# Patient Record
Sex: FEMALE | Race: BLACK OR AFRICAN AMERICAN | Employment: FULL TIME | URBAN - METROPOLITAN AREA
[De-identification: names, ages, dates, MRNs, and addresses within clinical notes are randomized per-mention and may not be internally consistent; named-entity substitution may affect disease eponyms.]

---

## 2020-02-06 ENCOUNTER — HOSPITAL ENCOUNTER (EMERGENCY)
Age: 35
Discharge: HOME | End: 2020-02-07
Attending: EMERGENCY MEDICINE
Payer: COMMERCIAL

## 2020-02-06 DIAGNOSIS — N30.00 ACUTE CYSTITIS WITHOUT HEMATURIA: ICD-10-CM

## 2020-02-06 DIAGNOSIS — R79.89 LOW TSH LEVEL: ICD-10-CM

## 2020-02-06 DIAGNOSIS — R45.851 SUICIDAL IDEATION: ICD-10-CM

## 2020-02-06 DIAGNOSIS — E86.0 DEHYDRATION: ICD-10-CM

## 2020-02-06 DIAGNOSIS — F32.A DEPRESSION, UNSPECIFIED DEPRESSION TYPE: Primary | ICD-10-CM

## 2020-02-06 DIAGNOSIS — E87.6 HYPOKALEMIA: ICD-10-CM

## 2020-02-06 LAB
ALBUMIN SERPL-MCNC: 4 G/DL (ref 3.5–5)
ALBUMIN/GLOB SERPL: 1.2 {RATIO} (ref 1.1–2.2)
ALP SERPL-CCNC: 112 U/L (ref 45–117)
ALT SERPL-CCNC: 27 U/L (ref 12–78)
ANION GAP SERPL CALC-SCNC: 7 MMOL/L (ref 5–15)
APAP SERPL-MCNC: 18 UG/ML (ref 10–30)
APPEARANCE UR: CLEAR
AST SERPL-CCNC: 17 U/L (ref 15–37)
BACTERIA URNS QL MICRO: ABNORMAL /HPF
BASOPHILS # BLD: 0.1 K/UL (ref 0–0.1)
BASOPHILS NFR BLD: 0 % (ref 0–1)
BILIRUB SERPL-MCNC: 2.9 MG/DL (ref 0.2–1)
BILIRUB UR QL CFM: NEGATIVE
BUN SERPL-MCNC: 11 MG/DL (ref 6–20)
BUN/CREAT SERPL: 16 (ref 12–20)
CALCIUM SERPL-MCNC: 8.9 MG/DL (ref 8.5–10.1)
CAOX CRY URNS QL MICRO: ABNORMAL
CHLORIDE SERPL-SCNC: 107 MMOL/L (ref 97–108)
CO2 SERPL-SCNC: 24 MMOL/L (ref 21–32)
COLOR UR: ABNORMAL
COMMENT, HOLDF: NORMAL
CREAT SERPL-MCNC: 0.67 MG/DL (ref 0.55–1.02)
DIFFERENTIAL METHOD BLD: ABNORMAL
EOSINOPHIL # BLD: 0.2 K/UL (ref 0–0.4)
EOSINOPHIL NFR BLD: 1 % (ref 0–7)
EPITH CASTS URNS QL MICRO: ABNORMAL /LPF
ERYTHROCYTE [DISTWIDTH] IN BLOOD BY AUTOMATED COUNT: 16.1 % (ref 11.5–14.5)
ETHANOL SERPL-MCNC: <10 MG/DL
GLOBULIN SER CALC-MCNC: 3.3 G/DL (ref 2–4)
GLUCOSE SERPL-MCNC: 104 MG/DL (ref 65–100)
GLUCOSE UR STRIP.AUTO-MCNC: NEGATIVE MG/DL
HCG SERPL-ACNC: <1 MIU/ML (ref 0–6)
HCT VFR BLD AUTO: 37.7 % (ref 35–47)
HGB BLD-MCNC: 14 G/DL (ref 11.5–16)
HGB UR QL STRIP: ABNORMAL
IMM GRANULOCYTES # BLD AUTO: 0.1 K/UL (ref 0–0.04)
IMM GRANULOCYTES NFR BLD AUTO: 1 % (ref 0–0.5)
KETONES UR QL STRIP.AUTO: 15 MG/DL
LEUKOCYTE ESTERASE UR QL STRIP.AUTO: ABNORMAL
LYMPHOCYTES # BLD: 2.9 K/UL (ref 0.8–3.5)
LYMPHOCYTES NFR BLD: 15 % (ref 12–49)
MCH RBC QN AUTO: 32.6 PG (ref 26–34)
MCHC RBC AUTO-ENTMCNC: 37.1 G/DL (ref 30–36.5)
MCV RBC AUTO: 87.9 FL (ref 80–99)
MONOCYTES # BLD: 1.1 K/UL (ref 0–1)
MONOCYTES NFR BLD: 6 % (ref 5–13)
NEUTS SEG # BLD: 14.7 K/UL (ref 1.8–8)
NEUTS SEG NFR BLD: 77 % (ref 32–75)
NITRITE UR QL STRIP.AUTO: NEGATIVE
NRBC # BLD: 0.02 K/UL (ref 0–0.01)
NRBC BLD-RTO: 0.1 PER 100 WBC
PH UR STRIP: 6 [PH] (ref 5–8)
PLATELET # BLD AUTO: 347 K/UL (ref 150–400)
PMV BLD AUTO: 8.7 FL (ref 8.9–12.9)
POTASSIUM SERPL-SCNC: 2.7 MMOL/L (ref 3.5–5.1)
PROT SERPL-MCNC: 7.3 G/DL (ref 6.4–8.2)
PROT UR STRIP-MCNC: 30 MG/DL
RBC # BLD AUTO: 4.29 M/UL (ref 3.8–5.2)
RBC #/AREA URNS HPF: ABNORMAL /HPF
SALICYLATES SERPL-MCNC: <1.7 MG/DL (ref 2.8–20)
SAMPLES BEING HELD,HOLD: NORMAL
SODIUM SERPL-SCNC: 138 MMOL/L (ref 136–145)
SP GR UR REFRACTOMETRY: 1.03 (ref 1–1.03)
TSH SERPL DL<=0.05 MIU/L-ACNC: 0.34 UIU/ML (ref 0.36–3.74)
UA: UC IF INDICATED,UAUC: ABNORMAL
UROBILINOGEN UR QL STRIP.AUTO: 1 EU/DL (ref 0.2–1)
WBC # BLD AUTO: 19 K/UL (ref 3.6–11)
WBC URNS QL MICRO: ABNORMAL /HPF

## 2020-02-06 PROCEDURE — 80053 COMPREHEN METABOLIC PANEL: CPT

## 2020-02-06 PROCEDURE — 81001 URINALYSIS AUTO W/SCOPE: CPT

## 2020-02-06 PROCEDURE — 84443 ASSAY THYROID STIM HORMONE: CPT

## 2020-02-06 PROCEDURE — 99284 EMERGENCY DEPT VISIT MOD MDM: CPT

## 2020-02-06 PROCEDURE — 36415 COLL VENOUS BLD VENIPUNCTURE: CPT

## 2020-02-06 PROCEDURE — 80307 DRUG TEST PRSMV CHEM ANLYZR: CPT

## 2020-02-06 PROCEDURE — 84702 CHORIONIC GONADOTROPIN TEST: CPT

## 2020-02-06 PROCEDURE — 87086 URINE CULTURE/COLONY COUNT: CPT

## 2020-02-06 PROCEDURE — 90791 PSYCH DIAGNOSTIC EVALUATION: CPT

## 2020-02-06 PROCEDURE — 85025 COMPLETE CBC W/AUTO DIFF WBC: CPT

## 2020-02-07 VITALS
WEIGHT: 209.88 LBS | HEART RATE: 92 BPM | RESPIRATION RATE: 16 BRPM | TEMPERATURE: 98.2 F | HEIGHT: 60 IN | DIASTOLIC BLOOD PRESSURE: 82 MMHG | BODY MASS INDEX: 41.2 KG/M2 | OXYGEN SATURATION: 99 % | SYSTOLIC BLOOD PRESSURE: 140 MMHG

## 2020-02-07 PROCEDURE — 74011250637 HC RX REV CODE- 250/637: Performed by: EMERGENCY MEDICINE

## 2020-02-07 RX ORDER — CEPHALEXIN 250 MG/1
500 CAPSULE ORAL
Status: COMPLETED | OUTPATIENT
Start: 2020-02-07 | End: 2020-02-07

## 2020-02-07 RX ORDER — CEPHALEXIN 500 MG/1
500 CAPSULE ORAL 4 TIMES DAILY
Qty: 28 CAP | Refills: 0 | Status: SHIPPED | OUTPATIENT
Start: 2020-02-07 | End: 2020-02-14

## 2020-02-07 RX ORDER — POTASSIUM CHLORIDE 750 MG/1
40 TABLET, FILM COATED, EXTENDED RELEASE ORAL
Status: COMPLETED | OUTPATIENT
Start: 2020-02-07 | End: 2020-02-07

## 2020-02-07 RX ORDER — POTASSIUM CHLORIDE 750 MG/1
20 TABLET, FILM COATED, EXTENDED RELEASE ORAL DAILY
Qty: 14 TAB | Refills: 0 | Status: SHIPPED | OUTPATIENT
Start: 2020-02-07 | End: 2020-02-14

## 2020-02-07 RX ADMIN — POTASSIUM CHLORIDE 40 MEQ: 750 TABLET, FILM COATED, EXTENDED RELEASE ORAL at 01:32

## 2020-02-07 RX ADMIN — CEPHALEXIN 500 MG: 250 CAPSULE ORAL at 01:33

## 2020-02-07 NOTE — DISCHARGE INSTRUCTIONS
Patient Education        Urinary Tract Infection in Women: Care Instructions  Your Care Instructions    A urinary tract infection, or UTI, is a general term for an infection anywhere between the kidneys and the urethra (where urine comes out). Most UTIs are bladder infections. They often cause pain or burning when you urinate. UTIs are caused by bacteria and can be cured with antibiotics. Be sure to complete your treatment so that the infection goes away. Follow-up care is a key part of your treatment and safety. Be sure to make and go to all appointments, and call your doctor if you are having problems. It's also a good idea to know your test results and keep a list of the medicines you take. How can you care for yourself at home? · Take your antibiotics as directed. Do not stop taking them just because you feel better. You need to take the full course of antibiotics. · Drink extra water and other fluids for the next day or two. This may help wash out the bacteria that are causing the infection. (If you have kidney, heart, or liver disease and have to limit fluids, talk with your doctor before you increase your fluid intake.)  · Avoid drinks that are carbonated or have caffeine. They can irritate the bladder. · Urinate often. Try to empty your bladder each time. · To relieve pain, take a hot bath or lay a heating pad set on low over your lower belly or genital area. Never go to sleep with a heating pad in place. To prevent UTIs  · Drink plenty of water each day. This helps you urinate often, which clears bacteria from your system. (If you have kidney, heart, or liver disease and have to limit fluids, talk with your doctor before you increase your fluid intake.)  · Urinate when you need to. · Urinate right after you have sex. · Change sanitary pads often. · Avoid douches, bubble baths, feminine hygiene sprays, and other feminine hygiene products that have deodorants.   · After going to the bathroom, wipe from front to back. When should you call for help? Call your doctor now or seek immediate medical care if:    · Symptoms such as fever, chills, nausea, or vomiting get worse or appear for the first time.     · You have new pain in your back just below your rib cage. This is called flank pain.     · There is new blood or pus in your urine.     · You have any problems with your antibiotic medicine.    Watch closely for changes in your health, and be sure to contact your doctor if:    · You are not getting better after taking an antibiotic for 2 days.     · Your symptoms go away but then come back. Where can you learn more? Go to http://rodger-jac.info/. Enter L048 in the search box to learn more about \"Urinary Tract Infection in Women: Care Instructions. \"  Current as of: December 19, 2018  Content Version: 12.2  © 8304-6080 [employer]. Care instructions adapted under license by [employer] (which disclaims liability or warranty for this information). If you have questions about a medical condition or this instruction, always ask your healthcare professional. [place] [address] any warranty or liability for your use of this information. Patient Education        Suicidal Thoughts and Behavior: Care Instructions  Your Care Instructions  You have been seen by a doctor because you've had thoughts about killing yourself. Maybe you have tried to do it. This is much different from having fleeting thoughts of death, which many people have from time to time. Your doctor and support team will work hard to help keep you safe. Your team may include a , a , and a counselor. Most people who think about suicide don't want to die. They think suicide will end their problems and pain. People who consider suicide often feel hopeless, helpless, and worthless. These thoughts can make a person feel that there is no other choice.   But you do have a choice. Help is always available. The doctor and staff members are taking you and your pain very seriously. It is important to remember that there are people who are willing and able to talk with you about your suicidal thoughts. Treatment and close follow-up care can help you feel better about life. Thoughts of hopelessness and suicide may come from being depressed. Depression is a medical condition. When you have depression, there may be problems with activity levels in certain parts of your brain. Chemicals in your brain called neurotransmitters may be out of balance. But depression can be treated. Treatment for depression includes counseling, medicines, and lifestyle changes. With treatment, you can feel better. Your doctor doesn't want you to hurt yourself. He or she may ask you to sign a \"no harm\" agreement or contract. This contract is your promise that you will not hurt yourself between now and your next visit. Be completely honest with your doctor if you feel that you can't sign it. You will get help. Follow-up care is a key part of your treatment and safety. Be sure to make and go to all appointments, and call your doctor if you are having problems. It's also a good idea to know your test results and keep a list of the medicines you take. How can you care for yourself at home? · Talk to someone. Reach out to family members, friends, your doctor, or a counselor. Be open and honest with them about your thoughts and feelings. · Be safe with medicines. Take your medicines exactly as prescribed. Call your doctor if you think you are having a problem with your medicine. · Avoid illegal drugs and alcohol. · Attend all counseling sessions recommended by your doctor. · Have someone take away sharp or dangerous objects, guns, and drugs from your home. · Keep the numbers for these national suicide hotlines: 1-751-733-TALK (3-216.686.1941) and 7-521-ELXCPTU (5-232.753.2427).   When should you call for help?  Call 911 anytime you think you may need emergency care. For example, call if:    · You feel you cannot stop from hurting yourself or someone else.   [facility] your doctor now or seek immediate medical care if:    · You have one or more warning signs of suicide. For example, call if:  ? You feel like giving away your possessions. ? You use illegal drugs or drink alcohol heavily. ? You talk or write about death. This may include writing suicide notes and talking about guns, knives, or pills. ? You start to spend a lot of time alone or spend more time alone than usual.     · You hear voices.     · You start acting in an aggressive way that's not normal for you.    Watch closely for changes in your health, and be sure to contact your doctor if you have any problems. Where can you learn more? Go to http://rodgerGraceful Tablesjac.info/. Enter U628 in the search box to learn more about \"Suicidal Thoughts and Behavior: Care Instructions. \"  Current as of: May 28, 2019  Content Version: 12.2  © 6871-2952 [employer]. Care instructions adapted under license by [employer] (which disclaims liability or warranty for this information). If you have questions about a medical condition or this instruction, always ask your healthcare professional. [address] any warranty or liability for your use of this information. Patient Education        Oral Rehydration: Care Instructions  Your Care Instructions    Dehydration occurs when your body loses too much water. This can happen if you do not drink enough fluids or lose a lot of fluid due to diarrhea, vomiting, or sweating. Being dehydrated can cause health problems and can even be life-threatening. To replace lost fluids, you need to drink liquid that contains special chemicals called electrolytes. Electrolytes keep your body working well. Plain water does not have electrolytes.  You also need to rest to prevent more fluid loss. Replacing water and electrolytes (oral rehydration) completely takes about 36 hours. But you should feel better within a few hours. Follow-up care is a key part of your treatment and safety. Be sure to make and go to all appointments, and call your doctor if you are having problems. It's also a good idea to know your test results and keep a list of the medicines you take. How can you care for yourself at home? · Take frequent sips of a drink such as Gatorade, Powerade, or other rehydration drinks that your doctor suggests. These replace both fluid and important chemicals (electrolytes) you need for balance in your blood. · Drink 2 quarts of cool liquid over 2 to 4 hours. You should have at least 10 glasses of liquid a day to replace lost fluid. If you have kidney, heart, or liver disease and have to limit fluids, talk with your doctor before you increase the amount of fluids you drink. · Make your own drink. Measure everything carefully. The drink may not work well or may even be harmful if the amounts are off. ? 1 quart water  ? ½ teaspoon salt  ? 6 teaspoons sugar  · Do not drink liquid with caffeine, such as coffee and ochoa. · Do not drink any alcohol. It can make you dehydrated. · Drink plenty of fluids, enough so that your urine is light yellow or clear like water. If you have kidney, heart, or liver disease and have to limit fluids, talk with your doctor before you increase the amount of fluids you drink. When should you call for help? Call 911 anytime you think you may need emergency care. For example, call if:    · You have signs of severe dehydration, such as:  ? You are confused or unable to stay awake.  ? You passed out (lost consciousness).    Call your doctor now or seek immediate medical care if:    · You still have signs of dehydration.  You have sunken eyes and a dry mouth, and you pass only a little dark urine.     · You are dizzy or lightheaded, or you feel like you may faint.     · You are not able to keep down fluids.    Watch closely for changes in your health, and be sure to contact your doctor if:    · You do not get better as expected. Where can you learn more? Go to http://rodger-jac.info/. Enter I040 in the search box to learn more about \"Oral Rehydration: Care Instructions. \"  Current as of: June 26, 2019  Content Version: 12.2  © 3007-0717 [employer]. Care instructions adapted under license by [employer] (which disclaims liability or warranty for this information). If you have questions about a medical condition or this instruction, always ask your healthcare professional. [place] [address] any warranty or liability for your use of this information. Patient Education        Hypokalemia: Care Instructions  Your Care Instructions    Hypokalemia (say \"uu-eq-ucs-SHLOMO-darcy-uh\") is a low level of potassium. The heart, muscles, kidneys, and nervous system all need potassium to work well. This problem has many different causes. Kidney problems, diet, and medicines like diuretics and laxatives can cause it. So can vomiting or diarrhea. In some cases, cancer is the cause. Your doctor may do tests to find the cause of your low potassium levels. You may need medicines to bring your potassium levels back to normal. You may also need regular blood tests to check your potassium. If you have very low potassium, you may need intravenous (IV) medicines. You also may need tests to check the electrical activity of your heart. Heart problems caused by low potassium levels can be very serious. Follow-up care is a key part of your treatment and safety. Be sure to make and go to all appointments, and call your doctor if you are having problems. It's also a good idea to know your test results and keep a list of the medicines you take. How can you care for yourself at home?   · If your doctor recommends it, eat foods that have a lot of potassium. These include fresh fruits, juices, and vegetables. They also include nuts, beans, and milk. · Be safe with medicines. If your doctor prescribes medicines or potassium supplements, take them exactly as directed. Call your doctor if you have any problems with your medicines. · Get your potassium levels tested as often as your doctor tells you. When should you call for help? Call 911 anytime you think you may need emergency care. For example, call if:    · You feel like your heart is missing beats. Heart problems caused by low potassium can cause death.     · You passed out (lost consciousness).     · You have a seizure.    Call your doctor now or seek immediate medical care if:    · You feel weak or unusually tired.     · You have severe arm or leg cramps.     · You have tingling or numbness.     · You feel sick to your stomach, or you vomit.     · You have belly cramps.     · You feel bloated or constipated.     · You have to urinate a lot.     · You feel very thirsty most of the time.     · You are dizzy or lightheaded, or you feel like you may faint.     · You feel depressed, or you lose touch with reality.    Watch closely for changes in your health, and be sure to contact your doctor if:    · You do not get better as expected. Where can you learn more? Go to http://rodger-jac.info/. Enter G358 in the search box to learn more about \"Hypokalemia: Care Instructions. \"  Current as of: November 6, 2018  Content Version: 12.2  © 2544-8311 [employer]. Care instructions adapted under license by [employer] (which disclaims liability or warranty for this information). If you have questions about a medical condition or this instruction, always ask your healthcare professional. [address] any warranty or liability for your use of this information.          Patient Education        Dehydration: Care Instructions  Your Care Instructions  Dehydration happens when your body loses too much fluid. This might happen when you do not drink enough water or you lose large amounts of fluids from your body because of diarrhea, vomiting, or sweating. Severe dehydration can be life-threatening. Water and minerals called electrolytes help put your body fluids back in balance. Learn the early signs of fluid loss, and drink more fluids to prevent dehydration. Follow-up care is a key part of your treatment and safety. Be sure to make and go to all appointments, and call your doctor if you are having problems. It's also a good idea to know your test results and keep a list of the medicines you take. How can you care for yourself at home? · To prevent dehydration, drink plenty of fluids, enough so that your urine is light yellow or clear like water. Choose water and other caffeine-free clear liquids until you feel better. If you have kidney, heart, or liver disease and have to limit fluids, talk with your doctor before you increase the amount of fluids you drink. · If you do not feel like eating or drinking, try taking small sips of water, sports drinks, or other rehydration drinks. · Get plenty of rest.  To prevent dehydration  · Add more fluids to your diet and daily routine, unless your doctor has told you not to. · During hot weather, drink more fluids. Drink even more fluids if you exercise a lot. Stay away from drinks with alcohol or caffeine. · Watch for the symptoms of dehydration. These include:  ? A dry, sticky mouth. ? Dark yellow urine, and not much of it. ? Dry and sunken eyes. ? Feeling very tired. · Learn what problems can lead to dehydration. These include:  ? Diarrhea, fever, and vomiting. ? Any illness with a fever, such as pneumonia or the flu. ? Activities that cause heavy sweating, such as endurance races and heavy outdoor work in hot or humid weather. ?  Alcohol or drug use or problems caused by quitting their use (withdrawal). ? Certain medicines, such as cold and allergy pills (antihistamines), diet pills (diuretics), and laxatives. ? Certain diseases, such as diabetes, cancer, and heart or kidney disease. When should you call for help? Call 911 anytime you think you may need emergency care. For example, call if:    · You passed out (lost consciousness).    Call your doctor now or seek immediate medical care if:    · You are confused and cannot think clearly.     · You are dizzy or lightheaded, or you feel like you may faint.     · You have signs of needing more fluids. You have sunken eyes and a dry mouth, and you pass only a little dark urine.     · You cannot keep fluids down.    Watch closely for changes in your health, and be sure to contact your doctor if:    · You are not making tears.     · Your skin is very dry and sags slowly back into place after you pinch it.     · Your mouth and eyes are very dry. Where can you learn more? Go to http://rodger-jac.info/. Enter C204 in the search box to learn more about \"Dehydration: Care Instructions. \"  Current as of: June 26, 2019  Content Version: 12.2  © 9319-6538 [employer]. Care instructions adapted under license by [employer] (which disclaims liability or warranty for this information). If you have questions about a medical condition or this instruction, always ask your healthcare professional. [place] [address] any warranty or liability for your use of this information.        Local Primary Care Physicians     [facility] [phone]   MD Tushar Gonzalez MD     [facility] Doctors [phone]   Doreen Lopez, MD Ariel Contreras MD Bohdan Rover, MD [address]  [phone]   MD Dung Bowen MD    [address] [facility] [phone]   MD Jack Tomlinson MD MD Jomar Ochoa MD     [facility] [phone]   YNFZ KXJCFT HARVINDER, MD Claire Leavitt, MD Juvenal Landrum, NP     [address] [phone]   MD Graham Quintero MD Rory Cuba, MD Juanita Actis, MD Oswaldo Torres, MD Harry Chen, MD Matt Hussein MD     [address] [facility]   Love Zeng MD    [address] [facility] [address] [phone]   Varun Moralez, MD Jordon Bray, NP   Flakito Velasquez, MD Naima Ashraf, MD Carina Jacobson, MD Kiran Rosenthal, MD     [address] [phone]   Laura Dudley, MD Anu Fraser, P   Sandra Denver, NP   Tracee Castelan, MD Brielle Baez, MD Chapito Casper, MD Magdiel Neri, MD    [facility] [phone]   Nori Burnett, MD Mary Taylor, DO Jovan Shannon, MD Dorys Lowe, MD Vilma Dangelo, MD Sulma Psoada MD     [address] [phone]   MD Soniya Wiggins MD [place] [phone]   MD Quyen Hassan MD Anton Ang, MD     [facility]icians [phone]   MD Noel Hansen, MD Tomeka Flores, MD Kelvin Huang, MD Renzo Mccurdy, NP   Anna Umana MD     [address] [phone]   MD Cici Venegas MD Adele Leos, MD     [address] [facility] [address] [phone]     MD Nando Santana, FNEVER Bill, SAMEER Bill, FNEVER Schmidt, MD Andres Griffin, NP   Shruthi Lopez, DO     Miscellaneous:     [facility] Departments    For adult and child immunizations, family planning, TB screening, STD testing and women's health services.   [facility] [address] [place] [address] 25   [phone]   TANESHA DIEZ [place]   [phone]   [address] [facility] [address] [phone]     [address] [facility]        [address] [place] [address]    For primary care services, woman and child wellness, and some clinics providing specialty care. [facility] -- [address] 25[address] [facility] [phone]  [address] [facility] 200 [facility] 3614 [facility] [phone]  [address] [facility] [address]  [phone]  [address] [facility] [address] [place] [address] 5850  Community  [phone]  [address] [phone]  [facility] 81 [facility] [phone]  [address] [phone]  [facility] 700 [facility] [address] [phone]    [address] [phone]  [place] 5850  Community  [phone]  [facility]  [address] ([email or web address]) [phone]       Sexual Health/Woman Wellness Clinics   For STD/HIV testing and treatment, pregnancy testing and services, men's health, birth control services, LGBT services, and hepatitis/HPV vaccine services. [place] [facility] [place] [employer] [address] [facility] [address] [facility] [address] [facility] 1579 [address] [phone]  [facility] [address], 5th floor [phone]  Pregnancy [address] [facility] [address]  [address] [phone]       [place] [address] [facility] 401 [facility]   [phone]  [place]   [phone]  Women, Infant and Children's Services: Johanna      600 [facility] Intervention [address] Psychiatry     [phone]  1325 [facility]   [phone]  6 [facility] [phone]    Thank you for allowing us to provide you with excellent care today. We hope we addressed all of your concerns and needs. We strive to provide excellent quality care in the Emergency Department. Please rate us as excellent, as anything less than excellent does not meet our expectations. If you feel that you have not received excellent quality care or timely care, please ask to speak to the nurse manager. Please choose us in the future for your continued health care needs. The exam and treatment you received in the Emergency Department were for an urgent problem and are not intended as complete care. It is important that you follow up with a doctor, nurse practitioner, or physician assistant for ongoing care. If your symptoms become worse or you do not improve as expected and you are unable to reach your usual health care provider, you should return to the Emergency Department. We are available 24 hours a day. Take this sheet with you when you go to your follow-up visit. If you have any problem arranging the follow-up visit, contact the Emergency Department immediately. If a prescription has been provided, please have it filled as soon as possible to avoid a delay in treatment. Read the entire medication instruction sheet provided to you by the pharmacy. If you have any questions or reservations about taking the medication due to side effects or interactions with other medications please call the ER or your primary care physician. Take this sheet with you when you go to your follow-up visit. Make an appointment with your family doctor or the physician you were referred to for follow-up of this visit, as this is mandatory follow-up.  Return to the ER if you are unable to be seen or if you are unable to be seen in a timely manner. If you have any problem arranging the follow-up visit, contact the Emergency Department immediately.        Local Primary Care Physicians     [facility] Family Physicians [phone]   MD Royal Martinez MD     [facility] Community Doctors [phone]   Katrina Wyman, [facility]   Pennie Martinez, MD Lissa Conroy Aas, MD [address]  [phone]   MD Shane Stringer MD    [address] [facility] [phone]   MD Epifanio Moser, MD Elana Riddle, MD Sylvester Lieberman MD     [facility] [phone]   [facility], MD Roxanna Jo, NP     [address] [phone]   [facility], MD Jadiel Nguyễn MD Cinthia Course, MD Vanda Avila MD Welby Spires, MD     [address] 57 [facility]   Orestes Echavarria MD    [address] [facility] [address] [phone]   Didier Jackman, MD Sharon Bailey, NP   Meaghan Lennon, MD Pinky Bennett MD Laurene Section, MD     [address] [facility] [phone]   MD Sathya Cullen, [facility]   Hong Wilson, NP   Lissy Caal, MD Pelon Pal MD Rande Peal, MD [facility] [phone]   MD Bibi Manning, MD Ivania Real MD Pecola Raymond, MD Raenelle Perch, MD     [facility] [phone]   MD Selvin Alvarez MD [place] [phone]   MD Darin Perry MD Quay Applebaum, MD     [facility] [phone]   Jackson Matthew, MD   Lynett MD Ricardo Colbert MD Eugenio Shoe, MD Simonne Haagensen, SAMMI Ewing MD     [address] [phone]   MD Den Chase MD Jose Luis Miguel MD     [address] [facility] [address] [phone]     MD Geovani Santana, SAMEER Melgoza, SAMEER Murillo MD Chaneta Robinsons, NP Lonne Iron, DO     Miscellaneous:     [facility] SYSTEM[facility] Departments    For adult and child immunizations, family planning, TB screening, STD testing and women's health services. [facility]: [place] [phone]     [place] 130 [facility] 1822   [facility]   729 [facility] [place] [address] [facility] [address] [phone] 2400 [facility]        [address] [place] [address]    For primary care services, woman and child wellness, and some clinics providing specialty care. [facility] -- 1011 [facility]. [address] [place] [address] [phone]  411 [facility] 200 [facility] 3614 [facility] [phone]  339 [facility] [address]  [phone] 11878 [facility] [address] [place] [address] 5850  Community  [phone]  7700 [facility] [address] [address] [place] [phone]  [facility] 81 [facility] [phone]  [facility] 1051 [facility] [phone]  [facility]: [facility] 700 [address], [facility], [address] [phone]    [facility] [address] [facility] [address] [phone]  [facility] Outreach [address]  [phone]  [employer]  [address], 5755 Cedar Anurag ([email or web address]) [phone]       Sexual Health/Woman Wellness Clinics   For STD/HIV testing and treatment, pregnancy testing and services, men's health, birth control services, LGBT services, and hepatitis/HPV vaccine services. Hay [employer] Mignon Irvins [employer] 201 N. [facility] 75 [facility] 1579 600 JUAN Ordonez [phone]  [facility] [address], 5th floor [phone]  Pregnancy [address] [place] [address] Women [address] [phone]       [place] 9967 [facility] 401 [facility],[address]   [phone]  [place]   [phone]  Women, Infant and Children's Services: [address] [phone]      [address] [phone]  [facility]   [phone]  [facility]   [phone]  [facility]     [phone]  [address] Crisis   1212 \[facility]\"" [phone]    Thank you for allowing us to provide you with excellent care today. We hope we addressed all of your concerns and needs. We strive to provide excellent quality care in the Emergency Department. Please rate us as excellent, as anything less than excellent does not meet our expectations. If you feel that you have not received excellent quality care or timely care, please ask to speak to the nurse manager. Please choose us in the future for your continued health care needs. The exam and treatment you received in the Emergency Department were for an urgent problem and are not intended as complete care. It is important that you follow up with a doctor, nurse practitioner, or physician assistant for ongoing care. If your symptoms become worse or you do not improve as expected and you are unable to reach your usual health care provider, you should return to the Emergency Department. We are available 24 hours a day. Take this sheet with you when you go to your follow-up visit. If you have any problem arranging the follow-up visit, contact the Emergency Department immediately.        If a prescription has been provided, please have it filled as soon as possible to avoid a delay in treatment. Read the entire medication instruction sheet provided to you by the pharmacy. If you have any questions or reservations about taking the medication due to side effects or interactions with other medications please call the ER or your primary care physician. Take this sheet with you when you go to your follow-up visit. Make an appointment with your family doctor or the physician you were referred to for follow-up of this visit, as this is mandatory follow-up. Return to the ER if you are unable to be seen or if you are unable to be seen in a timely manner. If you have any problem arranging the follow-up visit, contact the Emergency Department immediately. [address] [facility] [address]:  [place] [address]       [phone]      Accepts Insured Patients Only:  [facility]  [address]       [phone]  Near the corner of [facility] and [facility] 430 in the near [facility]. Accepts most insurance including Medicaid/Medicare. No psychiatry. On the [facility] bus line. [address] [facility] [address] most major insurances. Psychiatry available. Some DBT groups. [facility]. Shayne [phone]  [address], [facility] ([address] [place] [address])  34533 [facility], [facility] ([place])  2500 [facility] (Adolescents [place])    [address] [facility] ([address] ([address] [place] [address])    [address] 30 [facility], [address] [phone]   [facility]ell[facility] (Trauma)   Morgan Rodgers ([address])    [facility])    [phone]   Mixture of psychologists and psychiatrists. They do not accept Medicaid or Medicare.     The [address]       [phone]   Mixture of clinical social workers and psychologists. 1011 [facility]        [phone]  300 [facility], 5 [facility] Counseling and Treatment  (Clinicians: Cyril Watt LCSW and KHUSHBOO Sheikh both specialize in Trauma)  [address], 869 [facility]        [phone]     Denton Walker, [address]  [place], [address]         [address], 535 [facility], [address]  [place], [address]        [address], 535 [facility], 2231 [facility], [address]        [address] Counseling  [address]  [place], [address]        Peewee Farzana Chinchilla 1778 (*Two psychologists practice here)  R Jack Kennedy [address] [place], [address]  [place], [address]        [phone]    Sliding Scale/Financial Aid/Differing Payment Options  [facility]  975 [facility]      [phone]   Variety of treatment options, including DBT. [place] [address]       [phone]   Provides a variety of group and individual counseling options. Insurance, Medicaid, Medicare and sliding scale    [facility], [address]      [phone]    350 [facility] Psychological Services and Development ([facility])  [address] 1 [facility], [address] [place] [address]    [phone]  Training clinic for [employer] in Psychology; [place] [address] also carry some cases as well. Director: Ai Linton, Ph.D., LCP, NCSP (* She specializes in Anxiety; Child & Adol. Mental health)      Medicaid/Medicare providers in the [facility] OF [phone]    [facility]         [address]       [phone]    [place]  [address], [address]    [phone] ex.  54 [facility], Peewee [facility] 892, [address]      [phone]    1111 [facility] [address]     [phone]  [facility], 21 [facility] 67, [address]    1850 [facility], [address] [place] [address] [facility] Main Office    [phone]  730 [facility] Jan, [address]    Intensive Community Outreach Services (ICOS)  Call ahead for appointment time  200 [facility]     [phone]    [address]      [facility] 50    1 [facility]      [address], [address], [place])   [phone]      Services for patients without Medicaid, Medicare or Insurance  [address] [facility] [address]       [phone]   See handout in separate folder    [address] [facility] [address] on-site, psychiatry, AA meetings, counseling and social workers  Downtown: Ellen Latif 71     [address] [facility] [address]:  [place] [place] [address] [phone]

## 2020-02-07 NOTE — ED NOTES
Pt discharged home with written and verbal instructions given by Dr. Adriana Tapia and pt ambulated out of ED without difficulty.

## 2020-02-07 NOTE — BSMART NOTE
Comprehensive Assessment Form Part 1 Section I - Disposition Axis I - Adjustment Mood Disorder with depressed mood Axis II - Deferred Axis III - Past Medical History:  
Diagnosis Date  Diabetes ([address] 2014  
  GESTATIONAL DIABETES  Large breasts 10/1/2010  Pap smear    
  10/2010 Axis IV - Lacks support Axis V - The Medical Doctor to Psychiatrist conference was not completed. The Medical Doctor is in agreement with Psychiatrist disposition because of (reason) patient is not seeking an admission and does not require. The plan is discharge patient with resources for [facility]. The ED provider is in agreement with discharge The admitting Diagnosis is none. The Payor source is [employer]. The name of the representative was . This was approved for  days. The authorization number is . Section II - Integrated Summary Summary:  Patient is 29year old female reporting to Mat, \" for a while \" stressors at home - reports having SI thoughts with no active plan. At bedside, patient denied suicidal thoughts or plans. Patient verbalized she has had suicidal thoughts in the pass and reported had thought about overdosing over a year ago. Patient denied homicidal thoughts. Patient denied hallucinations. Patient reported earlier today she could not get her emotions together and was depressed. Patient stated that she has been having increased crying spells, as reported today she was crying and really did not have a reason why. Patient discussed that she has always had to suppress her feelings since a child as reported feeling she has been depressed since then but hasnot been diagnosed. Patient reported reaching out today to get resources but could get an appointment and could not find anyone that would take her insurance.  Patient was seeking help through [facility] and informed if she needed to come to ED so she could speak with someone. Patient expressed today was the worst day for her. Patient discussed having a child with Autism and recently getting out of relationship with her child's father. Patient stated she could not identify any other stressors. Patient reported often having negative comments about herself and having low self-esteem , patient stated she tries to talk with her mother but she does not understand. Patient verbalized feeling safe with herself in the home and is not seeking an admission. Patient verbalized she wants to have some resources she can connect with. The patienthas demonstrated mental capacity to provide informed consent. The information is given by the patient. The Chief Complaint is depression. The Precipitant Factors are stress, family changes. Previous Hospitalizations: no The patient has not previously been in restraints. Current Psychiatrist and/or  is none. Lethality Assessment: 
 
The potential for suicide noted by the following: not noted . The potential for homicide is not noted. The patient has not been a perpetrator of sexual or physical abuse. There are not pending charges. The patient is not felt to be at risk for self harm or harm to others. The attending nurse was advised not noted. Section III - Psychosocial 
The patient's overall mood and attitude is calm, cooperative, depression. Feelings of helplessness and hopelessness are not observed. Generalized anxiety is not observed. Panic is not observed. Phobias are not observed. Obsessive compulsive tendencies are not observed. Section IV - Mental Status Exam 
The patient's appearance shows no evidence of impairment. The patient's behavior shows no evidence of impairment. The patient is oriented to time, place, person and situation. The patient's speech shows no evidence of impairment. The patient's mood is depressed.   The range of affect shows no evidence of impairment. The patient's thought content demonstrates no evidence of impairment. The thought process shows no evidence of impairment. The patient's perception shows no evidence of impairment. The patient's memory shows no evidence of impairment. The patient's appetite shows no evidence of impairment. The patient's sleep shows no evidence of impairment. The patient's insight shows no evidence of impairment. The patient's judgement shows no evidence of impairment. Section V - Substance Abuse The patient is not using substances. The patient is using not noted. The patient has experienced the following withdrawal symptoms: N/A. Section VI - Living Arrangements The patient is single. The patient lives with a child/children. The patient has one child age 11. The patient does plan to return home upon discharge. The patient does not have legal issues pending. The patient's source of income comes from employment. Alevism and cultural practices have not been voiced at this time. The patient's greatest support comes from some family and this person will not be involved with the treatment. The patient has not been in an event described as horrible or outside the realm of ordinary life experience either currently or in the past. 
The patient has not been a victim of sexual/physical abuse. Section VII - Other Areas of Clinical Concern The highest grade achieved is unknown with the overall quality of school experience being described as unknown. The patient is currently unemployed and speaks [place] as a primary language. The patient has no communication impairments affecting communication. The patient's preference for learning can be described as: can read and write adequately.   The patient's hearing is normal.  The patient's vision is normal. 
 
 
Hitesh Yoo MA

## 2020-02-07 NOTE — ED PROVIDER NOTES
EMERGENCY DEPARTMENT HISTORY AND PHYSICAL EXAM     ----------------------------------------------------------------------------  Please note that this dictation was completed with [employer], the computer voice recognition software. Quite often unanticipated grammatical, syntax, homophones, and other interpretive errors are inadvertently transcribed by the computer software. Please disregard these errors. Please excuse any errors that have escaped final proofreading  ----------------------------------------------------------------------------      Date: 2/6/2020  Patient Name: Liudmila Verma    History of Presenting Illness     Chief Complaint   Patient presents with   [address] Problem     onset of sxs, \" for a while \" stressors at home - reports having SI thoughts with no active plan        History Provided By:  Patient    HPI: Liudmila Verma is a 29 y.o. female, with significant pmhx of fibroids, chronic back and foot pain, who presents by private vehicle  to the ED with c/o \"anxiety and depression. \". Patient notes that she is never been formally diagnosed with depression but has been having ongoing issues since she was [place] teen. \"  Patient notes that today she felt that she could not \"get her emotions together\" and has been continuously crying throughout the day. Patient notes that she contacted her OB/GYN who referred her to a psychiatrist.  Notes that the psychiatrist (Dr. Amirah Junior) could not see her immediately but after speaking with someone in the office they referred her to the [facility]. Patient was evaluated [facility] earlier today but was informed that her insurance did not cover their services. Patient was provided with other options including calling her insurance company to find a local counselor.   Patient later spoke with her brother who is a  in [place] and noted that if she continued to have feelings of worthlessness and vague suicidal ideation that she should go the patient is she does not have a specific plan is suicide but notes continues to have thoughts of \"she would not have to deal with this if she was not here. \"  Patient notes having multiple social stressors including and artistic son and strained relations with his father. Patient does not take any medications for depression or anxiety. No recent medication additions. Notes her last use of marijuana was 1 month ago and does not drink regularly. Denies having history of self-harm although notes having ideation of pill overdose in the past without actually having gone through with it. Patient also specifically denies any associated fevers, chills, CP, SOB, nausea, vomiting, diarrhea, abd pain, changes in BM, urinary sxs, or headache. Social Hx: + tobacco  denies EtOH , + THC Illicit Drugs    There are no other complaints, changes, or physical findings at this time. PCP: None    Allergies   Allergen Reactions    Amoxicillin Swelling     FACIAL SWELLING       Current Facility-Administered Medications   Medication Dose Route Frequency Provider Last Rate Last Dose    cephALEXin (KEFLEX) capsule 500 mg  500 mg Oral NOW Diogo Thao MD         Current Outpatient Medications   Medication Sig Dispense Refill    cephALEXin (KEFLEX) 500 mg capsule Take 1 Cap by mouth four (4) times daily for 7 days. 28 Cap 0    potassium chloride SR (KLOR-CON 10) 10 mEq tablet Take 2 Tabs by mouth daily for 7 days. 14 Tab 0    traMADol (ULTRAM) 50 mg tablet Take 50 mg by mouth every six (6) hours as needed for Pain.  methocarbamol (ROBAXIN) 500 mg tablet Take 500 mg by mouth daily as needed.          Past History     Past Medical History:  Past Medical History:   Diagnosis Date    Diabetes ([facility] [address]     GESTATIONAL DIABETES    Large breasts 10/1/2010    Pap smear     10/2010        Past Surgical History:  Past Surgical History:   Procedure Laterality Date    HX  SECTION  2014    HX LAP CHOLECYSTECTOMY      REMOVAL GALLBLADDER         Family History:  Family History   Problem Relation Age of Onset    Other Father     Cancer Maternal Grandmother     Hypertension Paternal Grandmother     Stroke Paternal Grandmother     Cancer Paternal Grandfather     Anesth Problems Neg Hx        Social History:  Social History     Tobacco Use    Smoking status: Current Every Day Smoker     Packs/day: 1.00    Smokeless tobacco: Never Used    Tobacco comment: black and mild   Substance Use Topics    Alcohol use: Yes     Alcohol/week: 1.7 standard drinks     Types: 2 Glasses of wine per week     Comment: \"occasionaly\"    Drug use: Yes     Types: Marijuana       Allergies: Allergies   Allergen Reactions    Amoxicillin Swelling     FACIAL SWELLING         Review of Systems   Review of Systems   Constitutional: Negative. Negative for fever. Eyes: Negative. Respiratory: Negative. Negative for shortness of breath. Cardiovascular: Negative for chest pain. Gastrointestinal: Negative for abdominal pain, nausea and vomiting. Endocrine: Negative. Genitourinary: Negative. Negative for difficulty urinating, dysuria and hematuria. Musculoskeletal: Negative. Skin: Negative. Neurological: Negative. Psychiatric/Behavioral: Positive for dysphoric mood and suicidal ideas. All other systems reviewed and are negative. Physical Exam   Physical Exam  Vitals signs and nursing note reviewed. Constitutional:       General: She is in acute distress. Appearance: She is well-developed. She is not diaphoretic. HENT:      Head: Normocephalic and atraumatic. Nose: Nose normal.   Eyes:      General: No scleral icterus. Conjunctiva/sclera: Conjunctivae normal.   Neck:      Musculoskeletal: Normal range of motion. Trachea: No tracheal deviation. Cardiovascular:      Rate and Rhythm: Normal rate and regular rhythm. Heart sounds: Normal heart sounds. No murmur. No friction rub.    Pulmonary:      Effort: Pulmonary effort is normal. No respiratory distress. Breath sounds: Normal breath sounds. No stridor. No wheezing or rales. Abdominal:      General: Bowel sounds are normal. There is no distension. Palpations: Abdomen is soft. Tenderness: There is no abdominal tenderness. There is no rebound. Musculoskeletal: Normal range of motion. General: No tenderness. Skin:     General: Skin is warm and dry. Findings: No rash. Neurological:      Mental Status: She is alert and oriented to person, place, and time. Cranial Nerves: No cranial nerve deficit. Psychiatric:         Mood and Affect: Mood is anxious and depressed. Affect is tearful. Speech: Speech normal.         Behavior: Behavior normal.         Thought Content: Thought content includes suicidal ideation. Thought content does not include homicidal ideation. Thought content does not include homicidal or suicidal plan.          Cognition and Memory: Cognition and memory normal.         Judgment: Judgment normal.           Diagnostic Study Results     Labs -     Recent Results (from the past 12 hour(s))   URINALYSIS W/ REFLEX CULTURE    Collection Time: 02/06/20  9:30 PM   Result Value Ref Range    Color DARK YELLOW      Appearance CLEAR CLEAR      Specific gravity 1.029 1.003 - 1.030      pH (UA) 6.0 5.0 - 8.0      Protein 30 (A) NEG mg/dL    Glucose NEGATIVE  NEG mg/dL    Ketone 15 (A) NEG mg/dL    Blood LARGE (A) NEG      Urobilinogen 1.0 0.2 - 1.0 EU/dL    Nitrites NEGATIVE  NEG      Leukocyte Esterase TRACE (A) NEG      WBC 0-4 /hpf    RBC 0-5 /hpf    Epithelial cells FEW /lpf    Bacteria 1+ /hpf    UA:UC IF INDICATED URINE CULTURE ORDERED      CA Oxalate crystals FEW     SAMPLES BEING HELD    Collection Time: 02/06/20  9:30 PM   Result Value Ref Range    SAMPLES BEING HELD  1 YLW UR     COMMENT        Add-on orders for these samples will be processed based on acceptable specimen integrity and analyte stability, which may vary by analyte. BILIRUBIN, CONFIRM    Collection Time: 02/06/20  9:30 PM   Result Value Ref Range    Bilirubin UA, confirm NEGATIVE  NEG     CBC WITH AUTOMATED DIFF    Collection Time: 02/06/20 10:29 PM   Result Value Ref Range    WBC 19.0 (H) 3.6 - 11.0 K/uL    RBC 4.29 3.80 - 5.20 M/uL    HGB 14.0 11.5 - 16.0 g/dL    HCT 37.7 35.0 - 47.0 %    MCV 87.9 80.0 - 99.0 FL    MCH 32.6 26.0 - 34.0 PG    MCHC 37.1 (H) 30.0 - 36.5 g/dL    RDW 16.1 (H) 11.5 - 14.5 %    PLATELET 845 986 - 924 K/uL    MPV 8.7 (L) 8.9 - 12.9 FL    NRBC 0.1 (H) 0  WBC    ABSOLUTE NRBC 0.02 (H) 0.00 - 0.01 K/uL    NEUTROPHILS 77 (H) 32 - 75 %    LYMPHOCYTES 15 12 - 49 %    MONOCYTES 6 5 - 13 %    EOSINOPHILS 1 0 - 7 %    BASOPHILS 0 0 - 1 %    IMMATURE GRANULOCYTES 1 (H) 0.0 - 0.5 %    ABS. NEUTROPHILS 14.7 (H) 1.8 - 8.0 K/UL    ABS. LYMPHOCYTES 2.9 0.8 - 3.5 K/UL    ABS. MONOCYTES 1.1 (H) 0.0 - 1.0 K/UL    ABS. EOSINOPHILS 0.2 0.0 - 0.4 K/UL    ABS. BASOPHILS 0.1 0.0 - 0.1 K/UL    ABS. IMM. GRANS. 0.1 (H) 0.00 - 0.04 K/UL    DF AUTOMATED     METABOLIC PANEL, COMPREHENSIVE    Collection Time: 02/06/20 10:29 PM   Result Value Ref Range    Sodium 138 136 - 145 mmol/L    Potassium 2.7 (LL) 3.5 - 5.1 mmol/L    Chloride 107 97 - 108 mmol/L    CO2 24 21 - 32 mmol/L    Anion gap 7 5 - 15 mmol/L    Glucose 104 (H) 65 - 100 mg/dL    BUN 11 6 - 20 MG/DL    Creatinine 0.67 0.55 - 1.02 MG/DL    BUN/Creatinine ratio 16 12 - 20      GFR est AA >60 >60 ml/min/1.73m2    GFR est non-AA >60 >60 ml/min/1.73m2    Calcium 8.9 8.5 - 10.1 MG/DL    Bilirubin, total 2.9 (H) 0.2 - 1.0 MG/DL    ALT (SGPT) 27 12 - 78 U/L    AST (SGOT) 17 15 - 37 U/L    Alk.  phosphatase 112 45 - 117 U/L    Protein, total 7.3 6.4 - 8.2 g/dL    Albumin 4.0 3.5 - 5.0 g/dL    Globulin 3.3 2.0 - 4.0 g/dL    A-G Ratio 1.2 1.1 - 2.2     ACETAMINOPHEN    Collection Time: 02/06/20 10:29 PM   Result Value Ref Range    Acetaminophen level 18 10 - 30 ug/mL   SALICYLATE    Collection Time: 02/06/20 10:29 PM   Result Value Ref Range    Salicylate level <4.7 (L) 2.8 - 20.0 MG/DL   ETHYL ALCOHOL    Collection Time: 02/06/20 10:29 PM   Result Value Ref Range    ALCOHOL(ETHYL),SERUM <10 <10 MG/DL   BETA HCG, QT    Collection Time: 02/06/20 10:29 PM   Result Value Ref Range    Beta HCG, QT <1 0 - 6 MIU/ML   TSH 3RD GENERATION    Collection Time: 02/06/20 10:29 PM   Result Value Ref Range    TSH 0.34 (L) 0.36 - 3.74 uIU/mL       Radiologic Studies -   No orders to display     CT Results  (Last 48 hours)    None        CXR Results  (Last 48 hours)    None            Medical Decision Making   I am the first provider for this patient. I reviewed the vital signs, available nursing notes, past medical history, past surgical history, family history and social history. Vital Signs-Reviewed the patient's vital signs. Patient Vitals for the past 12 hrs:   Temp Pulse Resp BP SpO2   02/07/20 0106 98.2 °F (36.8 °C) 92 16 140/82 99 %   02/06/20 2108 98.6 °F (37 °C) (!) 111 18 (!) 160/112 97 %       Pulse Oximetry Analysis - 97% on RA    Cardiac Monitor:   Rate: 111 bpm  Rhythm: Sinus tach    Records Reviewed: Nursing Notes, Old Medical Records, Previous Radiology Studies and Previous Laboratory Studies    Provider Notes (Medical Decision Making):     DDX:  Depression, electrolyte abnormality, thyroid dysfunction    Plan:  Medical screening labs, be smart    Impression:  Depression, SI    ED Course:   Initial assessment performed. The patients presenting problems have been discussed, and they are in agreement with the care plan formulated and outlined with them. I have encouraged them to ask questions as they arise throughout their visit.     I reviewed our electronic medical record system for any past medical records that were available that may contribute to the patients current condition, the nursing notes and and vital signs from today's visit    Nursing notes will be reviewed as they become available in realtime while the pt has been in the ED. Messi Gloria MD      TOBACCO COUNSELING:  During evaluation pt reported that they are a current tobacco user. I have spent 3 minutes discussing the medical risks of prolonged smoking habits and advised the patient of the benefits of the cessation of smoking, providing specific suggestions on how to quit. Pt has been counseled and encouraged to quit as soon as possible in order to decrease further risks to their health. Pt has conveyed their understanding of the risks involved should they continue to use tobacco products. Messi Gloria MD    HYPERTENSION COUNSELING:  Patient made aware of their elevated blood pressure and is instructed to follow up this week with their Primary Care or [address] [id number] for a recheck (should they be discharged.) Patient is counseled regarding consequences of chronic, uncontrolled hypertension including kidney disease, heart disease, stroke or even death. Patient states their understanding    I personally reviewed pt's imaging. Official read by radiology noted above. Messi Gloria MD    PROGRESS NOTE:  12:31 AM  Pt with likely UTI on labs with elevated white blood cell count. Made aware of hypokalemia and low TSH levels. Will treat for UTI and plan for follow-up as outpatient for other findings. Patient medically cleared for psych evaluation  Messi Gloria MD    CONSULT NOTE:   12:32 Keyonna Porras MD spoke with Ghazala Ojeda,   Specialty: Mental Health  [facility] due to depression and SI. Discussed pt's HPI and available diagnostic results thus far. Expressed concerns for needed psychiatric evaluation and consultation. Consultant will evaluate pt. Messi Gloria MD        1:32 AM  Progress note:  Pt noted to be feeling better, spoke with mental health provider who notes she does not seek inpatient treatment at this time.   Resources were provided including a clinic that patient can follow-up with to be seen in the next week. Patient denies suicidality at this time. Noted to contract for safety per be smart representative. , ready for discharge. Discussed lab findings with pt , specifically noting elevated wbc count and possible uti. Pt will follow up with PCP and mental health as instructed. All questions have been answered, pt voiced understanding and agreement with plan. Abx were prescribed, pt advised that diarrhea and rash are possible side effects of the medications. Specific return precautions provided in addition to instructions for pt to return to the ED immediately should sx worsen at any time. Adolfo Dooley MD           Critical Care Time:     none      Diagnosis     Clinical Impression:   1. Depression, unspecified depression type    2. Suicidal ideation    3. Hypokalemia    4. Low TSH level    5. Acute cystitis without hematuria    6. Dehydration        PLAN:  1. Current Discharge Medication List      START taking these medications    Details   cephALEXin (KEFLEX) 500 mg capsule Take 1 Cap by mouth four (4) times daily for 7 days. Qty: 28 Cap, Refills: 0      potassium chloride SR (KLOR-CON 10) 10 mEq tablet Take 2 Tabs by mouth daily for 7 days. Qty: 14 Tab, Refills: 0           2. Follow-up Information     Follow up With Specialties Details Why Contact Info    [facility] EMERGENCY DEPT Emergency Medicine  As needed [address] [place] [address]  [address] [facility]  [phone]        Return to ED if worse     Disposition:  1:33 AM  The patient's results have been reviewed with family and/or caregiver. They verbally convey their understanding and agreement of the patient's signs, symptoms, diagnosis, treatment and prognosis and additionally agree to follow up as recommended in the discharge instructions or to return to the Emergency Room should the patient's condition change prior to their follow-up appointment.  The family and/or caregiver verbally agrees with the care-plan and all of their questions have been answered. The discharge instructions have also been provided to the them with educational information regarding the patient's diagnosis as well a list of reasons why the patient would want to return to the ER prior to their follow-up appointment should their condition change. Antony Mandujano MD            This note will not be viewable in [address]

## 2020-02-07 NOTE — ED NOTES
Sophie Medrano called from [facility] and states the patient will be discharged and will fax over information.

## 2020-02-07 NOTE — ED NOTES
2200: Patient arrives to ED with CC of becoming increasingly overwhelmed and unable to control her emotions. Patient reports that she has experienced these feelings in the past but felt that today she was unable to settle herself. She denies current plan to harm herself, but admits to having similar thoughts in the past. She does however state \"I have thought about how everyone would be better off if I was not here anymore\". Patient placed on SI precautions. Changed into paper scrubs and belongings placed out of room. 2300: Patient remains in bed at this time. Now on q30 checks since she scored \"Moderate Risk\" for SI.    0000: Patient resting in bed at this time. No further needs. Q 30 checks remain in progress. 0100: Spoke to Edna from [facility] who will be calling back with [employer] information    0113: Patient in contact with Edna from [facility] at this time via video chat.

## 2020-02-08 LAB
BACTERIA SPEC CULT: NORMAL
CC UR VC: NORMAL
SERVICE CMNT-IMP: NORMAL

## 2022-06-01 ENCOUNTER — TRANSCRIBE ORDER (OUTPATIENT)
Dept: SCHEDULING | Age: 37
End: 2022-06-01

## 2022-06-01 DIAGNOSIS — D25.9 UTERINE FIBROID: Primary | ICD-10-CM

## 2022-06-01 DIAGNOSIS — N93.8 DYSFUNCTIONAL UTERINE BLEEDING: ICD-10-CM

## 2022-06-01 DIAGNOSIS — R10.84 ABDOMINAL PAIN, GENERALIZED: ICD-10-CM

## 2022-06-22 ENCOUNTER — HOSPITAL ENCOUNTER (OUTPATIENT)
Dept: MRI IMAGING | Age: 37
Discharge: HOME | End: 2022-06-22
Attending: NURSE PRACTITIONER
Payer: COMMERCIAL

## 2022-06-22 VITALS — WEIGHT: 209 LBS | BODY MASS INDEX: 40.82 KG/M2

## 2022-06-22 DIAGNOSIS — R10.84 ABDOMINAL PAIN, GENERALIZED: ICD-10-CM

## 2022-06-22 DIAGNOSIS — N93.8 DYSFUNCTIONAL UTERINE BLEEDING: ICD-10-CM

## 2022-06-22 DIAGNOSIS — D25.9 UTERINE FIBROID: ICD-10-CM

## 2022-06-22 PROCEDURE — A9576 INJ PROHANCE MULTIPACK: HCPCS | Performed by: RADIOLOGY

## 2022-06-22 PROCEDURE — 72197 MRI PELVIS W/O & W/DYE: CPT

## 2022-06-22 PROCEDURE — 74011250636 HC RX REV CODE- 250/636: Performed by: RADIOLOGY

## 2022-06-22 RX ADMIN — GADOTERIDOL 18 ML: 279.3 INJECTION, SOLUTION INTRAVENOUS at 13:08

## 2022-08-03 ENCOUNTER — OFFICE VISIT (OUTPATIENT)
Dept: OBGYN CLINIC | Age: 37
End: 2022-08-03
Payer: COMMERCIAL

## 2022-08-03 VITALS
HEART RATE: 88 BPM | DIASTOLIC BLOOD PRESSURE: 82 MMHG | BODY MASS INDEX: 36.05 KG/M2 | WEIGHT: 184.6 LBS | SYSTOLIC BLOOD PRESSURE: 132 MMHG

## 2022-08-03 DIAGNOSIS — Z01.419 WELL WOMAN EXAM WITH ROUTINE GYNECOLOGICAL EXAM: ICD-10-CM

## 2022-08-03 DIAGNOSIS — Z12.4 SCREENING FOR CERVICAL CANCER: Primary | ICD-10-CM

## 2022-08-03 PROCEDURE — 99385 PREV VISIT NEW AGE 18-39: CPT | Performed by: OBSTETRICS & GYNECOLOGY

## 2022-08-03 NOTE — LETTER
8/3/2022 2:48 PM    Ms. Óscar Dugan [place] [id number]      To whom it may concern,  Nuriachongsofi Peewee is under the care of [facility]. She was seen in our office today for a well women exam and a pap smear.       Sincerely,      Baljinder Juares MD

## 2022-08-03 NOTE — PROGRESS NOTES
Annual exam ages 21-44    Bam Salcedo is a No obstetric history on file. ,  39 y.o. female   Patient's last menstrual period was 2022 (exact date). She presents for her annual checkup. She is having heavy and painful menses. Has fibroids and plans embolization. With regard to the Gardasil vaccine, she is older than the FDA approved age to receive it. Menstrual status:    Her periods are heavy in flow. She is using three to ten pads or tampons per day, usually regular with a 26-32 day interval with 3-7 day duration. She has dysmenorrhea. She reports no premenstrual symptoms. Contraception:    The current method of family planning is none. Sexual history:    She  reports not being sexually active. Medical conditions:    Since her last annual GYN exam about three years ago, she has not the following changes in her health history: none. Surgical history confirmed with patient. has a past surgical history that includes pr removal gallbladder; hx  section (2014); and hx lap cholecystectomy. Pap and Mammogram History:    Her most recent Pap smear was normal, obtained 3 year(s) ago. The patient has never had a mammogram.    Breast Cancer History/Substance Abuse: negative    Past Medical History:   Diagnosis Date    Diabetes ([facility] [address]     GESTATIONAL DIABETES    Large breasts 10/1/2010    Pap smear     10/2010      Past Surgical History:   Procedure Laterality Date    HX  SECTION  2014    HX LAP CHOLECYSTECTOMY      REMOVAL GALLBLADDER         Current Outpatient Medications   Medication Sig Dispense Refill    traMADol (ULTRAM) 50 mg tablet Take 50 mg by mouth every six (6) hours as needed for Pain. (Patient not taking: Reported on 8/3/2022)      methocarbamol (ROBAXIN) 500 mg tablet Take 500 mg by mouth daily as needed. (Patient not taking: Reported on 8/3/2022)       Allergies: Amoxicillin     Tobacco History:  reports that she has been smoking.  She has been smoking an average of 1 pack per day. She has never used smokeless tobacco.  Alcohol Abuse:  reports current alcohol use of about 1.7 standard drinks per week. Drug Abuse:  reports current drug use. Drug: Marijuana. Family Medical/Cancer History:   Family History   Problem Relation Age of Onset    Other Father     Cancer Maternal Grandmother     Hypertension Paternal Grandmother     Stroke Paternal Grandmother     Cancer Paternal Grandfather     Anesth Problems Neg Hx         Review of Systems - History obtained from the patient  Constitutional: negative for weight loss, fever, night sweats  HEENT: negative for hearing loss, earache, congestion, snoring, sorethroat  CV: negative for chest pain, palpitations, edema  Resp: negative for cough, shortness of breath, wheezing  GI: negative for change in bowel habits, abdominal pain, black or bloody stools  : negative for frequency, dysuria, hematuria, vaginal discharge  MSK: negative for back pain, joint pain, muscle pain  Breast: negative for breast lumps, nipple discharge, galactorrhea  Skin :negative for itching, rash, hives  Neuro: negative for dizziness, headache, confusion, weakness  Psych: negative for anxiety, depression, change in mood  Heme/lymph: negative for bleeding, bruising, pallor    Physical Exam    Visit Vitals  /82   Pulse 88   Wt 184 lb 9.6 oz (83.7 kg)   LMP 07/06/2022 (Exact Date)   BMI 36.05 kg/m²       Constitutional  Appearance: well-nourished, well developed, alert, in no acute distress    HENT  Head and Face: appears normal    Neck  Inspection/Palpation: normal appearance, no masses or tenderness  Lymph Nodes: no lymphadenopathy present  Thyroid: gland size normal, nontender, no nodules or masses present on palpation    Chest  Respiratory Effort: breathing unlabored  Auscultation:    Cardiovascular  Heart:   Auscultation:     Breasts  Inspection of Breasts: breasts symmetrical, no skin changes, no discharge present, nipple appearance normal, no skin retraction present  Palpation of Breasts and Axillae: no masses present on palpation, no breast tenderness  Axillary Lymph Nodes: no lymphadenopathy present    Gastrointestinal  Abdominal Examination: abdomen non-tender to palpation, normal bowel sounds, no masses present  Liver and spleen: no hepatomegaly present, spleen not palpable  Hernias: no hernias identified    Genitourinary  External Genitalia: normal appearance for age, no discharge present, no tenderness present, no inflammatory lesions present, no masses present, no atrophy present  Vagina: normal vaginal vault without central or paravaginal defects, no discharge present, no inflammatory lesions present, no masses present  Bladder: non-tender to palpation  Urethra: appears normal  Cervix: normal   Uterus: irregular, 6 weeks size  Adnexa: no adnexal tenderness present, no adnexal masses present  Perineum: perineum within normal limits, no evidence of trauma, no rashes or skin lesions present  Anus: anus within normal limits, no hemorrhoids present  Inguinal Lymph Nodes: no lymphadenopathy present    Skin  General Inspection: no rash, no lesions identified    Neurologic/Psychiatric  Mental Status:  Orientation: grossly oriented to person, place and time  Mood and Affect: mood normal, affect appropriate    . Assessment:  Routine gynecologic examination  Her current medical status is satisfactory with fibroids.     Plan:  Counseled re: diet, exercise, healthy lifestyle  Return for yearly wellness visits  She plans uterine artery embolization

## 2022-08-06 LAB
CYTOLOGIST CVX/VAG CYTO: NORMAL
CYTOLOGY CVX/VAG DOC CYTO: NORMAL
CYTOLOGY CVX/VAG DOC THIN PREP: NORMAL
CYTOLOGY HISTORY:: NORMAL
DX ICD CODE: NORMAL
HPV I/H RISK 4 DNA CVX QL PROBE+SIG AMP: NEGATIVE
Lab: NORMAL
OTHER STN SPEC: NORMAL
STAT OF ADQ CVX/VAG CYTO-IMP: NORMAL